# Patient Record
Sex: FEMALE | Race: OTHER | HISPANIC OR LATINO | URBAN - METROPOLITAN AREA
[De-identification: names, ages, dates, MRNs, and addresses within clinical notes are randomized per-mention and may not be internally consistent; named-entity substitution may affect disease eponyms.]

---

## 2018-06-17 ENCOUNTER — INPATIENT (INPATIENT)
Facility: HOSPITAL | Age: 78
LOS: 2 days | Discharge: HOME | DRG: 641 | End: 2018-06-20
Attending: HOSPITALIST | Admitting: HOSPITALIST
Payer: MEDICARE

## 2018-06-17 VITALS
DIASTOLIC BLOOD PRESSURE: 74 MMHG | WEIGHT: 100.09 LBS | HEART RATE: 106 BPM | SYSTOLIC BLOOD PRESSURE: 125 MMHG | OXYGEN SATURATION: 99 % | HEIGHT: 63 IN | RESPIRATION RATE: 20 BRPM | TEMPERATURE: 98 F

## 2018-06-17 DIAGNOSIS — I10 ESSENTIAL (PRIMARY) HYPERTENSION: ICD-10-CM

## 2018-06-17 DIAGNOSIS — Z29.9 ENCOUNTER FOR PROPHYLACTIC MEASURES, UNSPECIFIED: ICD-10-CM

## 2018-06-17 DIAGNOSIS — E03.9 HYPOTHYROIDISM, UNSPECIFIED: ICD-10-CM

## 2018-06-17 DIAGNOSIS — R55 SYNCOPE AND COLLAPSE: ICD-10-CM

## 2018-06-17 DIAGNOSIS — Z90.49 ACQUIRED ABSENCE OF OTHER SPECIFIED PARTS OF DIGESTIVE TRACT: Chronic | ICD-10-CM

## 2018-06-17 LAB
ALBUMIN SERPL ELPH-MCNC: 3.2 G/DL — LOW (ref 3.5–5)
ALP SERPL-CCNC: 73 U/L — SIGNIFICANT CHANGE UP (ref 40–120)
ALT FLD-CCNC: 18 U/L DA — SIGNIFICANT CHANGE UP (ref 10–60)
ANION GAP SERPL CALC-SCNC: 8 MMOL/L — SIGNIFICANT CHANGE UP (ref 5–17)
APPEARANCE UR: CLEAR — SIGNIFICANT CHANGE UP
APTT BLD: 26.5 SEC — LOW (ref 27.5–37.4)
AST SERPL-CCNC: 39 U/L — SIGNIFICANT CHANGE UP (ref 10–40)
BASOPHILS # BLD AUTO: 0.1 K/UL — SIGNIFICANT CHANGE UP (ref 0–0.2)
BASOPHILS NFR BLD AUTO: 0.7 % — SIGNIFICANT CHANGE UP (ref 0–2)
BILIRUB SERPL-MCNC: 0.3 MG/DL — SIGNIFICANT CHANGE UP (ref 0.2–1.2)
BILIRUB UR-MCNC: NEGATIVE — SIGNIFICANT CHANGE UP
BUN SERPL-MCNC: 15 MG/DL — SIGNIFICANT CHANGE UP (ref 7–18)
CALCIUM SERPL-MCNC: 8.4 MG/DL — SIGNIFICANT CHANGE UP (ref 8.4–10.5)
CHLORIDE SERPL-SCNC: 103 MMOL/L — SIGNIFICANT CHANGE UP (ref 96–108)
CK MB BLD-MCNC: <1.6 % — SIGNIFICANT CHANGE UP (ref 0–3.5)
CK MB CFR SERPL CALC: <1 NG/ML — SIGNIFICANT CHANGE UP (ref 0–3.6)
CK SERPL-CCNC: 62 U/L — SIGNIFICANT CHANGE UP (ref 21–215)
CO2 SERPL-SCNC: 24 MMOL/L — SIGNIFICANT CHANGE UP (ref 22–31)
COLOR SPEC: YELLOW — SIGNIFICANT CHANGE UP
CREAT SERPL-MCNC: 0.72 MG/DL — SIGNIFICANT CHANGE UP (ref 0.5–1.3)
DIFF PNL FLD: NEGATIVE — SIGNIFICANT CHANGE UP
EOSINOPHIL # BLD AUTO: 0.3 K/UL — SIGNIFICANT CHANGE UP (ref 0–0.5)
EOSINOPHIL NFR BLD AUTO: 2.1 % — SIGNIFICANT CHANGE UP (ref 0–6)
GLUCOSE SERPL-MCNC: 143 MG/DL — HIGH (ref 70–99)
GLUCOSE UR QL: NEGATIVE — SIGNIFICANT CHANGE UP
HCT VFR BLD CALC: 35.8 % — SIGNIFICANT CHANGE UP (ref 34.5–45)
HGB BLD-MCNC: 11.5 G/DL — SIGNIFICANT CHANGE UP (ref 11.5–15.5)
INR BLD: 1.06 RATIO — SIGNIFICANT CHANGE UP (ref 0.88–1.16)
KETONES UR-MCNC: NEGATIVE — SIGNIFICANT CHANGE UP
LEUKOCYTE ESTERASE UR-ACNC: NEGATIVE — SIGNIFICANT CHANGE UP
LYMPHOCYTES # BLD AUTO: 1.7 K/UL — SIGNIFICANT CHANGE UP (ref 1–3.3)
LYMPHOCYTES # BLD AUTO: 13.1 % — SIGNIFICANT CHANGE UP (ref 13–44)
MCHC RBC-ENTMCNC: 30.1 PG — SIGNIFICANT CHANGE UP (ref 27–34)
MCHC RBC-ENTMCNC: 32.1 GM/DL — SIGNIFICANT CHANGE UP (ref 32–36)
MCV RBC AUTO: 93.8 FL — SIGNIFICANT CHANGE UP (ref 80–100)
MONOCYTES # BLD AUTO: 0.9 K/UL — SIGNIFICANT CHANGE UP (ref 0–0.9)
MONOCYTES NFR BLD AUTO: 7.2 % — SIGNIFICANT CHANGE UP (ref 2–14)
NEUTROPHILS # BLD AUTO: 9.9 K/UL — HIGH (ref 1.8–7.4)
NEUTROPHILS NFR BLD AUTO: 76.9 % — SIGNIFICANT CHANGE UP (ref 43–77)
NITRITE UR-MCNC: NEGATIVE — SIGNIFICANT CHANGE UP
NT-PROBNP SERPL-SCNC: 123 PG/ML — SIGNIFICANT CHANGE UP (ref 0–450)
PH UR: 6.5 — SIGNIFICANT CHANGE UP (ref 5–8)
PLATELET # BLD AUTO: 264 K/UL — SIGNIFICANT CHANGE UP (ref 150–400)
POTASSIUM SERPL-MCNC: 3.1 MMOL/L — LOW (ref 3.5–5.3)
POTASSIUM SERPL-SCNC: 3.1 MMOL/L — LOW (ref 3.5–5.3)
PROT SERPL-MCNC: 6.8 G/DL — SIGNIFICANT CHANGE UP (ref 6–8.3)
PROT UR-MCNC: NEGATIVE — SIGNIFICANT CHANGE UP
PROTHROM AB SERPL-ACNC: 11.6 SEC — SIGNIFICANT CHANGE UP (ref 9.8–12.7)
RBC # BLD: 3.82 M/UL — SIGNIFICANT CHANGE UP (ref 3.8–5.2)
RBC # FLD: 13 % — SIGNIFICANT CHANGE UP (ref 10.3–14.5)
SODIUM SERPL-SCNC: 135 MMOL/L — SIGNIFICANT CHANGE UP (ref 135–145)
SP GR SPEC: 1 — LOW (ref 1.01–1.02)
TROPONIN I SERPL-MCNC: <0.015 NG/ML — SIGNIFICANT CHANGE UP (ref 0–0.04)
UROBILINOGEN FLD QL: NEGATIVE — SIGNIFICANT CHANGE UP
WBC # BLD: 12.9 K/UL — HIGH (ref 3.8–10.5)
WBC # FLD AUTO: 12.9 K/UL — HIGH (ref 3.8–10.5)

## 2018-06-17 PROCEDURE — 71045 X-RAY EXAM CHEST 1 VIEW: CPT | Mod: 26

## 2018-06-17 PROCEDURE — 99285 EMERGENCY DEPT VISIT HI MDM: CPT

## 2018-06-17 RX ORDER — ASPIRIN/CALCIUM CARB/MAGNESIUM 324 MG
81 TABLET ORAL DAILY
Qty: 0 | Refills: 0 | Status: DISCONTINUED | OUTPATIENT
Start: 2018-06-17 | End: 2018-06-20

## 2018-06-17 RX ORDER — ENOXAPARIN SODIUM 100 MG/ML
40 INJECTION SUBCUTANEOUS DAILY
Qty: 0 | Refills: 0 | Status: DISCONTINUED | OUTPATIENT
Start: 2018-06-17 | End: 2018-06-20

## 2018-06-17 RX ORDER — LEVOTHYROXINE SODIUM 125 MCG
50 TABLET ORAL DAILY
Qty: 0 | Refills: 0 | Status: DISCONTINUED | OUTPATIENT
Start: 2018-06-17 | End: 2018-06-18

## 2018-06-17 RX ORDER — POTASSIUM CHLORIDE 20 MEQ
10 PACKET (EA) ORAL
Qty: 0 | Refills: 0 | Status: COMPLETED | OUTPATIENT
Start: 2018-06-17 | End: 2018-06-17

## 2018-06-17 RX ORDER — SODIUM CHLORIDE 9 MG/ML
3 INJECTION INTRAMUSCULAR; INTRAVENOUS; SUBCUTANEOUS ONCE
Qty: 0 | Refills: 0 | Status: COMPLETED | OUTPATIENT
Start: 2018-06-17 | End: 2018-06-17

## 2018-06-17 RX ORDER — ATORVASTATIN CALCIUM 80 MG/1
40 TABLET, FILM COATED ORAL AT BEDTIME
Qty: 0 | Refills: 0 | Status: DISCONTINUED | OUTPATIENT
Start: 2018-06-17 | End: 2018-06-20

## 2018-06-17 RX ORDER — SODIUM CHLORIDE 9 MG/ML
1000 INJECTION INTRAMUSCULAR; INTRAVENOUS; SUBCUTANEOUS
Qty: 0 | Refills: 0 | Status: DISCONTINUED | OUTPATIENT
Start: 2018-06-17 | End: 2018-06-20

## 2018-06-17 RX ORDER — METOPROLOL TARTRATE 50 MG
25 TABLET ORAL DAILY
Qty: 0 | Refills: 0 | Status: DISCONTINUED | OUTPATIENT
Start: 2018-06-17 | End: 2018-06-19

## 2018-06-17 RX ADMIN — Medication 100 MILLIEQUIVALENT(S): at 17:36

## 2018-06-17 RX ADMIN — SODIUM CHLORIDE 3 MILLILITER(S): 9 INJECTION INTRAMUSCULAR; INTRAVENOUS; SUBCUTANEOUS at 14:07

## 2018-06-17 RX ADMIN — Medication 100 MILLIEQUIVALENT(S): at 19:52

## 2018-06-17 RX ADMIN — Medication 100 MILLIEQUIVALENT(S): at 18:40

## 2018-06-17 NOTE — H&P ADULT - HISTORY OF PRESENT ILLNESS
79 yo Female with PMH of Hypothyroidism and HTN , comes in with complaint of abdominal pain followed by diarrhea ( 2 loose bowel movements ) . Pain was sudden in onset , about 4/10 in intensity , generalized , accompanied and partially relived by 2 loose BMs. Pain resolved spontaneously Patient also gave a very vague history of syncope , where she remembers going to the bathroom and then she was on the couch , when her daughter woke her up after she passed out for few minutes. Patient does not recall the event. No similar episodes in past . Patient felt that she ate outside Pizza yesterday and that's why she had pain. She also report her daughter being sick recently with a cold.   Other than that no hospitalizations , no fever , no chest pain , no palpitations .  Patient had a mechanical fall 2 years back , ended up with Left sided THR.     In Ed , BP : 125/58 mm hg , HR : 92 , Temp : 98 F  cbc wnl   bmp : hypokalemia   T1 negative   Ua negative

## 2018-06-17 NOTE — H&P ADULT - PROBLEM SELECTOR PLAN 3
Patient does not remember the name of medication and Pharmacy   Will start with Synthyroid 50 for now   f/u tsh and f/u medication dose

## 2018-06-17 NOTE — H&P ADULT - ASSESSMENT
79 yo Female with PMH of Hypothyroidism and HTN , comes in with complaint of abdominal pain followed by diarrhea ( 2 loose bowel movements ) . Pain was sudden in onset , about 4/10 in intensity , generalized , accompanied and partially relived by 2 loose BMs. Pain resolved spontaneously Patient also gave a very vague history of syncope , where she remembers going to the bathroom and then she was on the couch , when her daughter woke her up after she passed out for few minutes. Patient does not recall the event. No similar episodes in past . Patient felt that she ate outside [employer] yesterday and that's why she had pain. She also report her daughter being sick recently with a cold.   Other than that no hospitalizations , no fever , no chest pain , no palpitations .  Patient had a mechanical fall 2 years back , ended up with Left sided THR.     In Ed , BP : 125/58 mm hg , HR : 92 , Temp : 98 F  cbc wnl   bmp : hypokalemia   T1 negative   Ua negative     Will admit to telemetry for Syncope.

## 2018-06-17 NOTE — ED ADULT NURSE NOTE - OBJECTIVE STATEMENT
pt is here for syncope.  According to family member, pt was unresponsive for 10 sce, denied NV/D, denied fever pt calm at this time with family member.

## 2018-06-17 NOTE — H&P ADULT - PROBLEM SELECTOR PLAN 2
Patient does not remember the name of medication and Pharmacy   Daughter will get medicine tomorrow   Please follow up  Will start with Metoprolol for now   Dash diet

## 2018-06-17 NOTE — H&P ADULT - PROBLEM SELECTOR PLAN 1
Patient comes in with very vague history of syncope   Does not remember the event   No injury to head however   Will rule out cardiac causes by monitoring on telemetry and trending troponins  Start with aspirin ,statin   However patient's symptoms were preceded by loose stools and Gi symptoms   Most likely syncope du to dehydration   Will start with NS at 100 cc/h   Will send C.diff and stool studies if patient has further episodes of diarrhea ( resolved for now)  Could just be viral Gastroenteritis  Orthostatic positive on admission , likely from dehydration , will hydrate   Patient was also tachycardiac , however Sinus tachycardia on EKG and Monitor   Also from dehydration

## 2018-06-17 NOTE — ED PROVIDER NOTE - OBJECTIVE STATEMENT
79 y/o F with PMHx of hypothyroidism, HTN and no significant PSHx presents to the ED s/p syncopal episode today with associated weakness and dizziness. Patient reports syncopal episode occurred while seated in chair at home. Patient reports episode of diarrhea which was described as loose and yellow. Denies chest pain, SOB, abdominal pain, fever or any other complaints. Allergies: PCN (rash).

## 2018-06-17 NOTE — H&P ADULT - PROBLEM SELECTOR PLAN 4
IMPROVE VTE Individual Risk Assessment          RISK                                                          Points  [  ] Previous VTE                                                3  [  ] Thrombophilia                                             2  [  ] Lower limb paralysis                                   2        (unable to hold up >15 seconds)    [  ] Current Cancer                                             2         (within 6 months)  [ x ] Immobilization > 24 hrs                              1  [  ] ICU/CCU stay > 24 hours                             1  [  x] Age > 60                                                         1    IMPROVE VTE Score:  2   Will start with Levonox for dvt ppx

## 2018-06-17 NOTE — ED PROVIDER NOTE - MEDICAL DECISION MAKING DETAILS
79 y/o elderly female presents following episode of syncope possibly due to dehydration. Will check labs, administer IV fluids and reassess.

## 2018-06-17 NOTE — ED PROVIDER NOTE - ENMT, MLM
Airway patent, Nasal mucosa clear. Oral mucosa is dry. Throat has no vesicles, no oropharyngeal exudates and uvula is midline.

## 2018-06-17 NOTE — H&P ADULT - NSHPLABSRESULTS_GEN_ALL_CORE
11.5   12.9  )-----------( 264      ( 17 Jun 2018 14:10 )             35.8     06-17    135  |  103  |  15  ----------------------------<  143<H>  3.1<L>   |  24  |  0.72    Ca    8.4      17 Jun 2018 14:10    TPro  6.8  /  Alb  3.2<L>  /  TBili  0.3  /  DBili  x   /  AST  39  /  ALT  18  /  AlkPhos  73  06-17    < from: Xray Chest 1 View AP/PA (06.17.18 @ 14:17) >    IMPRESSION:  Nonspecific increased interstitial lung markings. No gross consolidation   or pleural effusion.    Heart size cannot be accurately assessed in this projection.    Right upper quadrant surgical clips.      < end of copied text >

## 2018-06-17 NOTE — H&P ADULT - ATTENDING COMMENTS
patient was seen and examined at bedside, feeling ok,  she states feeling abdominal pain, after which she went to the bathroom and had diarrhea x2 BM, she then felt dizzy, with sweating and feeling sleepy. she also lost control over urine before waking up and finding her daughter in front of her.  physical exam:  tele: episode of NSVT    as above    A/p  1- syncope:   possible vasovagal, rule out seizures:   EEG to rule out seizure  orthostatic negative  ct head, carotid doppler with attention to basilar circulation.  Cardio evaluation: evidence of NSVT on tele, but patient denies any CP, palpitation.    2- Hypothyroidism: TSH low,  will confirm dose of levothyroxine,     3- Replace vitamin b12, low norm.         rest as above.

## 2018-06-17 NOTE — H&P ADULT - NSHPPHYSICALEXAM_GEN_ALL_CORE
GENERAL: tired looking , flushed , tense , shiny skin   HEENT: Normocephalic;  conjunctivae and sclerae clear; moist mucous membranes;   NECK : supple  CHEST/LUNG: Clear to auscultation bilaterally with good air entry   HEART: S1 S2  regular; no murmurs, ; tachycardia +  ABDOMEN: Soft, Nontender, Nondistended; Bowel sounds present  EXTREMITIES: no cyanosis; no edema; no calf tenderness  SKIN: warm and dry; no rash  NERVOUS SYSTEM:  Awake and alert; Oriented  to place, person and time ; no new deficits

## 2018-06-18 LAB
24R-OH-CALCIDIOL SERPL-MCNC: 22.6 NG/ML — LOW (ref 30–80)
ANION GAP SERPL CALC-SCNC: 9 MMOL/L — SIGNIFICANT CHANGE UP (ref 5–17)
BASOPHILS # BLD AUTO: 0.1 K/UL — SIGNIFICANT CHANGE UP (ref 0–0.2)
BASOPHILS NFR BLD AUTO: 0.7 % — SIGNIFICANT CHANGE UP (ref 0–2)
BUN SERPL-MCNC: 6 MG/DL — LOW (ref 7–18)
CALCIUM SERPL-MCNC: 8.3 MG/DL — LOW (ref 8.4–10.5)
CHLORIDE SERPL-SCNC: 112 MMOL/L — HIGH (ref 96–108)
CHOLEST SERPL-MCNC: 149 MG/DL — SIGNIFICANT CHANGE UP (ref 10–199)
CK MB BLD-MCNC: 2.1 % — SIGNIFICANT CHANGE UP (ref 0–3.5)
CK MB CFR SERPL CALC: 1.3 NG/ML — SIGNIFICANT CHANGE UP (ref 0–3.6)
CK SERPL-CCNC: 62 U/L — SIGNIFICANT CHANGE UP (ref 21–215)
CO2 SERPL-SCNC: 22 MMOL/L — SIGNIFICANT CHANGE UP (ref 22–31)
CREAT SERPL-MCNC: 0.58 MG/DL — SIGNIFICANT CHANGE UP (ref 0.5–1.3)
EOSINOPHIL # BLD AUTO: 0.4 K/UL — SIGNIFICANT CHANGE UP (ref 0–0.5)
EOSINOPHIL NFR BLD AUTO: 4.7 % — SIGNIFICANT CHANGE UP (ref 0–6)
GLUCOSE SERPL-MCNC: 81 MG/DL — SIGNIFICANT CHANGE UP (ref 70–99)
HBA1C BLD-MCNC: 5.4 % — SIGNIFICANT CHANGE UP (ref 4–5.6)
HCT VFR BLD CALC: 33.4 % — LOW (ref 34.5–45)
HDLC SERPL-MCNC: 53 MG/DL — SIGNIFICANT CHANGE UP (ref 40–125)
HGB BLD-MCNC: 10.6 G/DL — LOW (ref 11.5–15.5)
LACTATE SERPL-SCNC: 0.9 MMOL/L — SIGNIFICANT CHANGE UP (ref 0.7–2)
LIPID PNL WITH DIRECT LDL SERPL: 82 MG/DL — SIGNIFICANT CHANGE UP
LYMPHOCYTES # BLD AUTO: 2.4 K/UL — SIGNIFICANT CHANGE UP (ref 1–3.3)
LYMPHOCYTES # BLD AUTO: 25.5 % — SIGNIFICANT CHANGE UP (ref 13–44)
MAGNESIUM SERPL-MCNC: 1.7 MG/DL — SIGNIFICANT CHANGE UP (ref 1.6–2.6)
MCHC RBC-ENTMCNC: 30.2 PG — SIGNIFICANT CHANGE UP (ref 27–34)
MCHC RBC-ENTMCNC: 31.9 GM/DL — LOW (ref 32–36)
MCV RBC AUTO: 94.9 FL — SIGNIFICANT CHANGE UP (ref 80–100)
MONOCYTES # BLD AUTO: 0.6 K/UL — SIGNIFICANT CHANGE UP (ref 0–0.9)
MONOCYTES NFR BLD AUTO: 6.5 % — SIGNIFICANT CHANGE UP (ref 2–14)
NEUTROPHILS # BLD AUTO: 5.8 K/UL — SIGNIFICANT CHANGE UP (ref 1.8–7.4)
NEUTROPHILS NFR BLD AUTO: 62.6 % — SIGNIFICANT CHANGE UP (ref 43–77)
PHOSPHATE SERPL-MCNC: 2.2 MG/DL — LOW (ref 2.5–4.5)
PLATELET # BLD AUTO: 249 K/UL — SIGNIFICANT CHANGE UP (ref 150–400)
POTASSIUM SERPL-MCNC: 4.1 MMOL/L — SIGNIFICANT CHANGE UP (ref 3.5–5.3)
POTASSIUM SERPL-SCNC: 4.1 MMOL/L — SIGNIFICANT CHANGE UP (ref 3.5–5.3)
RBC # BLD: 3.52 M/UL — LOW (ref 3.8–5.2)
RBC # FLD: 13.2 % — SIGNIFICANT CHANGE UP (ref 10.3–14.5)
SODIUM SERPL-SCNC: 143 MMOL/L — SIGNIFICANT CHANGE UP (ref 135–145)
TOTAL CHOLESTEROL/HDL RATIO MEASUREMENT: 2.8 RATIO — LOW (ref 3.3–7.1)
TRIGL SERPL-MCNC: 72 MG/DL — SIGNIFICANT CHANGE UP (ref 10–149)
TROPONIN I SERPL-MCNC: 0.02 NG/ML — SIGNIFICANT CHANGE UP (ref 0–0.04)
TSH SERPL-MCNC: 0.19 UU/ML — LOW (ref 0.34–4.82)
TSH SERPL-MCNC: 0.26 UU/ML — LOW (ref 0.34–4.82)
VIT B12 SERPL-MCNC: 378 PG/ML — SIGNIFICANT CHANGE UP (ref 232–1245)
WBC # BLD: 9.3 K/UL — SIGNIFICANT CHANGE UP (ref 3.8–10.5)
WBC # FLD AUTO: 9.3 K/UL — SIGNIFICANT CHANGE UP (ref 3.8–10.5)

## 2018-06-18 PROCEDURE — 93880 EXTRACRANIAL BILAT STUDY: CPT | Mod: 26

## 2018-06-18 PROCEDURE — 95819 EEG AWAKE AND ASLEEP: CPT | Mod: 26

## 2018-06-18 PROCEDURE — 70450 CT HEAD/BRAIN W/O DYE: CPT | Mod: 26

## 2018-06-18 PROCEDURE — 99223 1ST HOSP IP/OBS HIGH 75: CPT | Mod: AI,GC

## 2018-06-18 RX ORDER — SODIUM,POTASSIUM PHOSPHATES 278-250MG
1 POWDER IN PACKET (EA) ORAL ONCE
Qty: 0 | Refills: 0 | Status: COMPLETED | OUTPATIENT
Start: 2018-06-18 | End: 2018-06-18

## 2018-06-18 RX ORDER — HYDROCHLOROTHIAZIDE 25 MG
12.5 TABLET ORAL DAILY
Qty: 0 | Refills: 0 | Status: DISCONTINUED | OUTPATIENT
Start: 2018-06-18 | End: 2018-06-19

## 2018-06-18 RX ORDER — PREGABALIN 225 MG/1
1000 CAPSULE ORAL DAILY
Qty: 0 | Refills: 0 | Status: DISCONTINUED | OUTPATIENT
Start: 2018-06-18 | End: 2018-06-20

## 2018-06-18 RX ORDER — LISINOPRIL 2.5 MG/1
10 TABLET ORAL DAILY
Qty: 0 | Refills: 0 | Status: DISCONTINUED | OUTPATIENT
Start: 2018-06-18 | End: 2018-06-20

## 2018-06-18 RX ORDER — LEVOTHYROXINE SODIUM 125 MCG
75 TABLET ORAL DAILY
Qty: 0 | Refills: 0 | Status: DISCONTINUED | OUTPATIENT
Start: 2018-06-18 | End: 2018-06-19

## 2018-06-18 RX ORDER — CHOLECALCIFEROL (VITAMIN D3) 125 MCG
2000 CAPSULE ORAL DAILY
Qty: 0 | Refills: 0 | Status: DISCONTINUED | OUTPATIENT
Start: 2018-06-18 | End: 2018-06-20

## 2018-06-18 RX ORDER — ACETAMINOPHEN 500 MG
650 TABLET ORAL EVERY 6 HOURS
Qty: 0 | Refills: 0 | Status: DISCONTINUED | OUTPATIENT
Start: 2018-06-18 | End: 2018-06-20

## 2018-06-18 RX ADMIN — Medication 81 MILLIGRAM(S): at 12:35

## 2018-06-18 RX ADMIN — ENOXAPARIN SODIUM 40 MILLIGRAM(S): 100 INJECTION SUBCUTANEOUS at 12:32

## 2018-06-18 RX ADMIN — Medication 50 MICROGRAM(S): at 06:21

## 2018-06-18 RX ADMIN — LISINOPRIL 10 MILLIGRAM(S): 2.5 TABLET ORAL at 12:36

## 2018-06-18 RX ADMIN — ATORVASTATIN CALCIUM 40 MILLIGRAM(S): 80 TABLET, FILM COATED ORAL at 21:57

## 2018-06-18 RX ADMIN — SODIUM CHLORIDE 100 MILLILITER(S): 9 INJECTION INTRAMUSCULAR; INTRAVENOUS; SUBCUTANEOUS at 02:09

## 2018-06-18 RX ADMIN — Medication 650 MILLIGRAM(S): at 18:55

## 2018-06-18 RX ADMIN — Medication 12.5 MILLIGRAM(S): at 15:20

## 2018-06-18 RX ADMIN — Medication 1 TABLET(S): at 15:19

## 2018-06-18 RX ADMIN — Medication 75 MICROGRAM(S): at 15:20

## 2018-06-18 RX ADMIN — Medication 650 MILLIGRAM(S): at 19:30

## 2018-06-18 RX ADMIN — PREGABALIN 1000 MICROGRAM(S): 225 CAPSULE ORAL at 14:59

## 2018-06-18 NOTE — EEG REPORT - NS EEG TEXT BOX
[facility]   COMPREHENSIVE EPILEPSY CENTER   REPORT OF ROUTINE VIDEO EEG     [facility]: [address], [place] [address], Ph#: [phone]  [facility]: [address], [place] [address], Ph#: [phone]  Office: [address] [place] [address], [facility] [address], [place] [address] Ph#: [phone]    Patient Name: MARINA DENNIS  Age and : 78y (40)  MRN #: [id number]  Location: [address]  Referring Physician: Jaylyn Fine    Study Date: 18    _____________________________________________________________  TECHNICAL INFORMATION    Placement and Labeling of Electrodes:  The EEG was performed utilizing 20 channels referential EEG connections (coronal over temporal over parasagittal montage) using all standard 10-20 electrode placements with EKG.  Recording was at a sampling rate of 256 samples per second per channel.  Time synchronized digital video recording was done simultaneously with EEG recording.  A low light infrared camera was used for low light recording.  Riccardo and seizure detection algorithms were utilized.    _____________________________________________________________  HISTORY    Patient is a 78y old  Female who presents with a chief complaint of diarrhea , syncope (2018 22:59)      PERTINENT MEDICATION:  none    _____________________________________________________________  STUDY INTERPRETATION    Findings:  The background was continuous, spontaneously variable, and reactive. During wakefulness, the posterior dominant rhythm consisted of symmetric, well-modulated 10 Hz activity, with amplitude to 60 uV, that attenuated to eye opening.  Low amplitude frontal beta was noted in wakefulness.    Background Slowing:  No generalized background slowing was present.    Focal Slowing:   Possible rare intermittent subtle theta/delta slowing in the left anterior-mid temporal region (F7/T7).    Sleep Background:  - Drowsiness was characterized by fragmentation, attenuation, and slowing of the background activity.    - Stage II sleep transients were not recorded.    Other Non-Epileptiform Findings:  None were present.    Interictal Epileptiform Activity:   None were present.    Events:  Clinical events: None recorded.  Seizures: None recorded.    Activation Procedures:   Hyperventilation was performed and did not elicit any abnormality.  Photic stimulation was performed and did not elicit any abnormality.     Artifacts:  Intermittent myogenic and movement artifacts were noted.    ECG:  The heart rate on single channel ECG was predominantly between 70-80 BPM.    _____________________________________________________________  EEG SUMMARY/CLASSIFICATION    Rare intermittent subtle theta/delta slowing in the left anterior-mid temporal region (F7/T7).    _____________________________________________________________  EEG IMPRESSION/CLINICAL CORRELATE    1. Probable functional abnormality in the left anterior-mid temporal region.  2. No epileptiform pattern or seizure seen.      Jacqui Pineda MD  Attending Physician, [facility]

## 2018-06-18 NOTE — PROGRESS NOTE ADULT - PROBLEM SELECTOR PLAN 2
Patient does not remember the name of medication and Pharmacy   Will start with Metoprolol for now   Dash diet C/W Hydrochlorothiazide and Lisinopril    Dash diet

## 2018-06-18 NOTE — PROGRESS NOTE ADULT - ASSESSMENT
77 y/o Female with PMH of Hypothyroidism and HTN , comes in with complaint of abdominal pain followed by diarrhea (2 loose bowel movements ) . Pain was sudden in onset , about 4/10 in intensity , generalized , accompanied and partially relived by 2 loose BMs. Pain resolved spontaneously. Patient also gave a very vague history of syncope , where she remembers going to the bathroom and then she was on the couch , when her daughter woke her up after she passed out for few minutes. Patient does not recall the event. No similar episodes in past . Patient felt that she ate outside Pizza yesterday and that's why she had pain. She also report her daughter being sick recently with a cold. Other than that no hospitalizations , no fever , no chest pain , no palpitations .  Patient had a mechanical fall 2 years back , ended up with Left sided THR.     Admitted to the telemetry floor for Syncope.

## 2018-06-18 NOTE — PROGRESS NOTE ADULT - PROBLEM SELECTOR PLAN 1
Vague history of syncope, in which patient does not remember the event with no injury to head however  Work Up was done:   Cardiac enzymes were negative  Orthostatic positive   Most likely syncope due to dehydration after episodes of abdominal pain and diarrhea   No more episodes of diarrhea   Could just be viral Gastroenteritis  Management:   -Started with aspirin ,statin   -IV fluids Vague history of syncope, in which patient does not remember the event with no injury to head however  Spoke to the daughter. She was out for about 15 seconds and loss sphincter tone Difficulty to aroused   Work Up was done:   Cardiac enzymes were negative  Orthostatic positive   Most likely syncope due to dehydration after episodes of abdominal pain and diarrhea   No more episodes of diarrhea   Could just be viral Gastroenteritis  Management:   -Started with aspirin ,statin   -IV fluids - Vague history of syncope, in which patient does not remember the event with no injury to head however.   - Spoke to the daughter. She was out for about 15 seconds and loss sphincter tone. Seizure with difficulty to aroused  R/O Seizure with difficulty to aroused.    Work Up was done:   Cardiac enzymes were negative  Orthostatic positive   Most likely syncope due to dehydration after episodes of abdominal pain and diarrhea   No more episodes of diarrhea   Could just be viral Gastroenteritis  Management:   -Started with aspirin and statin   -IV fluids  -CT Scan of the head  -EGG : R/O Seizures - Vague history of syncope, in which patient does not remember the event with no injury to head however.   - Spoke to the daughter. She was out for about 15 seconds and loss sphincter tone. Seizure with difficulty to aroused  R/O Seizure with difficulty to aroused.    Work Up was done:   Cardiac enzymes were negative  Orthostatic positive--> Repeated Negative   Most likely syncope due to dehydration after episodes of abdominal pain and diarrhea   No more episodes of diarrhea   Management:   -Started with aspirin and statin   -IV fluids  -CT Scan of the head and Carotid doppler  -EGG : R/O Seizures

## 2018-06-18 NOTE — PROGRESS NOTE ADULT - SUBJECTIVE AND OBJECTIVE BOX
==================PGY 1 Note===================   Discussed with supervising resident and primary attending    ================CHIEF COMPLAINT===============  Patient is a 78y old  Female who presents with a chief complaint of diarrhea , syncope (2018 22:59)        =========INTERVAL HPI/OVERNIGHT EVENTS=========  Offers no new complaints      ============CURRENT MEDICATIONS===============    MEDICATIONS  (STANDING):  aspirin  chewable 81 milliGRAM(s) Oral daily  atorvastatin 40 milliGRAM(s) Oral at bedtime  enoxaparin Injectable 40 milliGRAM(s) SubCutaneous daily  levothyroxine 50 MICROGram(s) Oral daily  metoprolol succinate ER 25 milliGRAM(s) Oral daily  sodium chloride 0.9%. 1000 milliLiter(s) (100 mL/Hr) IV Continuous <Continuous>    MEDICATIONS  (PRN):        ============REVIEW OF SYSTEMS==================    CONSTITUTIONAL: No fever  EYES: no acute visual disturbances  NECK: No pain or stiffness  RESPIRATORY: No cough; No shortness of breath  CARDIOVASCULAR: No chest pain, no palpitations  GASTROINTESTINAL: No pain. No nausea or vomiting; No diarrhea   NEUROLOGICAL: No headache or numbness, no tremors  MUSCULOSKELETAL: No joint pain, no muscle pain  GENITOURINARY: no dysuria, no frequency, no hesitancy  PSYCHIATRY: no depression , no anxiety  ALL OTHER  ROS negative      ================VITALS SIGNS=====================  Vital Signs Last 24 Hrs  T(C): 36.8 (2018 05:29), Max: 36.9 (2018 19:45)  T(F): 98.2 (2018 05:29), Max: 98.5 (2018 23:08)  HR: 85 (2018 05:29) (85 - 106)  BP: 110/46 (2018 05:29) (109/53 - 133/60)  BP(mean): --  RR: 18 (2018 05:29) (16 - 20)  SpO2: 100% (2018 05:29) (97% - 100%)    ===============PHYSICAL EXAM====================    GENERAL: NAD  HEENT: Normocephalic;  conjunctivae and sclerae clear; moist mucous membranes;   NECK : supple  CHEST/LUNG: Clear to auscultation bilaterally with good air entry   HEART: S1 S2  regular; no murmurs, gallops or rubs  ABDOMEN: Soft, Nontender, Nondistended; Bowel sounds present  EXTREMITIES: no cyanosis; no edema; no calf tenderness  SKIN: warm and dry; no rash  NERVOUS SYSTEM:  Awake and alert; Oriented  to place, person and time    ==============LABORATORIES======================  LABS:                        11.5   12.9  )-----------( 264      ( 2018 14:10 )             35.8     06-17    135  |  103  |  15  ----------------------------<  143<H>  3.1<L>   |  24  |  0.72    Ca    8.4      2018 14:10    TPro  6.8  /  Alb  3.2<L>  /  TBili  0.3  /  DBili  x   /  AST  39  /  ALT  18  /  AlkPhos  73  06-17    PT/INR - ( 2018 14:10 )   PT: 11.6 sec;   INR: 1.06 ratio         PTT - ( 2018 14:10 )  PTT:26.5 sec  Urinalysis Basic - ( 2018 17:33 )    Color: Yellow / Appearance: Clear / S.005 / pH: x  Gluc: x / Ketone: Negative  / Bili: Negative / Urobili: Negative   Blood: x / Protein: Negative / Nitrite: Negative   Leuk Esterase: Negative / RBC: x / WBC x   Sq Epi: x / Non Sq Epi: x / Bacteria: x      CAPILLARY BLOOD GLUCOSE      POCT Blood Glucose.: 149 mg/dL (2018 12:45)      =============INPUTS/OUPUTS=====================        RADIOLOGY & ADDITIONAL TESTS:    Imaging Personally Reviewed:  YES    Consultant(s) Notes Reviewed:   YES    Care Discussed with Consultants : YES    Plan of care was discussed with patient  and /or primary care giver; all questions and concerns were addressed and care was aligned with patient's wishes. Time was allowed for questions that were answered to the best of my abilities

## 2018-06-19 LAB
ANION GAP SERPL CALC-SCNC: 10 MMOL/L — SIGNIFICANT CHANGE UP (ref 5–17)
BASOPHILS # BLD AUTO: 0.1 K/UL — SIGNIFICANT CHANGE UP (ref 0–0.2)
BASOPHILS NFR BLD AUTO: 1.2 % — SIGNIFICANT CHANGE UP (ref 0–2)
BUN SERPL-MCNC: 6 MG/DL — LOW (ref 7–18)
CALCIUM SERPL-MCNC: 8.7 MG/DL — SIGNIFICANT CHANGE UP (ref 8.4–10.5)
CHLORIDE SERPL-SCNC: 107 MMOL/L — SIGNIFICANT CHANGE UP (ref 96–108)
CO2 SERPL-SCNC: 23 MMOL/L — SIGNIFICANT CHANGE UP (ref 22–31)
CREAT SERPL-MCNC: 0.54 MG/DL — SIGNIFICANT CHANGE UP (ref 0.5–1.3)
EOSINOPHIL # BLD AUTO: 0.5 K/UL — SIGNIFICANT CHANGE UP (ref 0–0.5)
EOSINOPHIL NFR BLD AUTO: 6.3 % — HIGH (ref 0–6)
GLUCOSE SERPL-MCNC: 88 MG/DL — SIGNIFICANT CHANGE UP (ref 70–99)
HCT VFR BLD CALC: 31.7 % — LOW (ref 34.5–45)
HGB BLD-MCNC: 10.7 G/DL — LOW (ref 11.5–15.5)
LYMPHOCYTES # BLD AUTO: 2 K/UL — SIGNIFICANT CHANGE UP (ref 1–3.3)
LYMPHOCYTES # BLD AUTO: 25.8 % — SIGNIFICANT CHANGE UP (ref 13–44)
MCHC RBC-ENTMCNC: 31.7 PG — SIGNIFICANT CHANGE UP (ref 27–34)
MCHC RBC-ENTMCNC: 33.8 GM/DL — SIGNIFICANT CHANGE UP (ref 32–36)
MCV RBC AUTO: 93.8 FL — SIGNIFICANT CHANGE UP (ref 80–100)
MONOCYTES # BLD AUTO: 0.6 K/UL — SIGNIFICANT CHANGE UP (ref 0–0.9)
MONOCYTES NFR BLD AUTO: 8.3 % — SIGNIFICANT CHANGE UP (ref 2–14)
NEUTROPHILS # BLD AUTO: 4.5 K/UL — SIGNIFICANT CHANGE UP (ref 1.8–7.4)
NEUTROPHILS NFR BLD AUTO: 58.5 % — SIGNIFICANT CHANGE UP (ref 43–77)
PLATELET # BLD AUTO: 228 K/UL — SIGNIFICANT CHANGE UP (ref 150–400)
POTASSIUM SERPL-MCNC: 3.7 MMOL/L — SIGNIFICANT CHANGE UP (ref 3.5–5.3)
POTASSIUM SERPL-SCNC: 3.7 MMOL/L — SIGNIFICANT CHANGE UP (ref 3.5–5.3)
RBC # BLD: 3.38 M/UL — LOW (ref 3.8–5.2)
RBC # FLD: 12.9 % — SIGNIFICANT CHANGE UP (ref 10.3–14.5)
SODIUM SERPL-SCNC: 140 MMOL/L — SIGNIFICANT CHANGE UP (ref 135–145)
WBC # BLD: 7.6 K/UL — SIGNIFICANT CHANGE UP (ref 3.8–10.5)
WBC # FLD AUTO: 7.6 K/UL — SIGNIFICANT CHANGE UP (ref 3.8–10.5)

## 2018-06-19 PROCEDURE — 99233 SBSQ HOSP IP/OBS HIGH 50: CPT | Mod: GC

## 2018-06-19 RX ORDER — LEVOTHYROXINE SODIUM 125 MCG
50 TABLET ORAL DAILY
Qty: 0 | Refills: 0 | Status: DISCONTINUED | OUTPATIENT
Start: 2018-06-19 | End: 2018-06-20

## 2018-06-19 RX ORDER — METOPROLOL TARTRATE 50 MG
50 TABLET ORAL DAILY
Qty: 0 | Refills: 0 | Status: DISCONTINUED | OUTPATIENT
Start: 2018-06-19 | End: 2018-06-20

## 2018-06-19 RX ADMIN — Medication 12.5 MILLIGRAM(S): at 06:19

## 2018-06-19 RX ADMIN — ATORVASTATIN CALCIUM 40 MILLIGRAM(S): 80 TABLET, FILM COATED ORAL at 21:57

## 2018-06-19 RX ADMIN — PREGABALIN 1000 MICROGRAM(S): 225 CAPSULE ORAL at 13:07

## 2018-06-19 RX ADMIN — Medication 75 MICROGRAM(S): at 06:19

## 2018-06-19 RX ADMIN — LISINOPRIL 10 MILLIGRAM(S): 2.5 TABLET ORAL at 06:19

## 2018-06-19 RX ADMIN — Medication 25 MILLIGRAM(S): at 06:19

## 2018-06-19 RX ADMIN — ENOXAPARIN SODIUM 40 MILLIGRAM(S): 100 INJECTION SUBCUTANEOUS at 13:07

## 2018-06-19 RX ADMIN — Medication 81 MILLIGRAM(S): at 13:06

## 2018-06-19 RX ADMIN — Medication 2000 UNIT(S): at 13:06

## 2018-06-19 NOTE — PROGRESS NOTE ADULT - PROBLEM SELECTOR PLAN 1
- Vague history of syncope, in which patient does not remember the event with no injury to head however.   - Spoke to the daughter. She was out for about 15 seconds and loss sphincter tone. Seizure with difficulty to aroused  R/O Seizure with difficulty to aroused.    Work Up was done:   Cardiac enzymes were negative  Orthostatic positive--> Repeated Negative   Most likely syncope due to dehydration after episodes of abdominal pain and diarrhea   No more episodes of diarrhea   -CT Scan of the head and Carotid doppler: Negative for bleeding or Significant stenosis  Management:   -EGG : R/O Seizures - Vague history of syncope, in which patient does not remember the event with no injury to head however.   - Spoke to the daughter. She was out for about 15 seconds and loss sphincter tone. Seizure with difficulty to aroused  R/O Seizure with difficulty to aroused.    Work Up was done:   Cardiac enzymes were negative  Orthostatic positive--> Repeated Negative   Most likely syncope due to dehydration after episodes of abdominal pain and diarrhea   No more episodes of diarrhea   -CT Scan of the head and Carotid doppler: Negative for bleeding or Significant stenosis  -EGG : Negative for Seizures  -Changed Metoprolol to 50 BID and D/C HCTZ

## 2018-06-19 NOTE — DISCHARGE NOTE ADULT - CARE PLAN
Principal Discharge DX:	Syncope  Secondary Diagnosis:	HTN (hypertension)  Assessment and plan of treatment:	Continue with blood pressure medication. Maintain a healthy diet that consist of low sugar, low fat, low sodium diet. Exercise frequently if possible.  Follow up with primary care physician in one week after discharge.  Secondary Diagnosis:	Hypothyroidism  Assessment and plan of treatment:	Continue with your synthroid medication. Get constant follow up with TSH levels with you primary care physician for medication adjustment if needed. Principal Discharge DX:	Syncope  Goal:	Resolved. Likely vasovagal.  Assessment and plan of treatment:	Admitted to the telemetry floor for Syncope.   Work Up was done: Cardiac enzymes were negative. Orthostatic positive with repeated levels Negative after fluids. Most likely syncope due to dehydration after episodes of abdominal pain and diarrhea. No more episodes of diarrhea. CT Scan of the head and Carotid doppler: Negative for bleeding or any significant stenosis. EGG : Negative for Seizures  Discontinue HCTZ and changed to Metoprolol  Secondary Diagnosis:	HTN (hypertension)  Assessment and plan of treatment:	Continue with blood pressure medication. Maintain a healthy diet that consist of low sugar, low fat, low sodium diet. Exercise frequently if possible.  Follow up with primary care physician in one week after discharge.  Secondary Diagnosis:	Hypothyroidism  Assessment and plan of treatment:	Continue with your synthroid medication. Get constant follow up with TSH levels with you primary care physician for medication adjustment if needed.

## 2018-06-19 NOTE — CONSULT NOTE ADULT - ASSESSMENT
Patient's syncope is likely secondary to dehydration from diarrheal illness. Unlikely to be seizure although EEG performed but did not show any epileptiform activity. Also unlikely to be cardiac although telemetry does have findings of NSVT of 3-5 beats.

## 2018-06-19 NOTE — DISCHARGE NOTE ADULT - PLAN OF CARE
Continue with blood pressure medication. Maintain a healthy diet that consist of low sugar, low fat, low sodium diet. Exercise frequently if possible.  Follow up with primary care physician in one week after discharge. Continue with your synthroid medication. Get constant follow up with TSH levels with you primary care physician for medication adjustment if needed. Resolved. Likely vasovagal. Admitted to the telemetry floor for Syncope.   Work Up was done: Cardiac enzymes were negative. Orthostatic positive with repeated levels Negative after fluids. Most likely syncope due to dehydration after episodes of abdominal pain and diarrhea. No more episodes of diarrhea. CT Scan of the head and Carotid doppler: Negative for bleeding or any significant stenosis. EGG : Negative for Seizures  Discontinue HCTZ and changed to Metoprolol

## 2018-06-19 NOTE — PROGRESS NOTE ADULT - SUBJECTIVE AND OBJECTIVE BOX
==================PGY 1 Note===================   Discussed with supervising resident and primary attending    ================CHIEF COMPLAINT===============  Patient is a 78y old  Female who presents with a chief complaint of diarrhea , syncope (2018 22:59)        =========INTERVAL HPI/OVERNIGHT EVENTS=========  Offers no new complaints      ============CURRENT MEDICATIONS===============    MEDICATIONS  (STANDING):  aspirin  chewable 81 milliGRAM(s) Oral daily  atorvastatin 40 milliGRAM(s) Oral at bedtime  cholecalciferol 2000 Unit(s) Oral daily  cyanocobalamin Injectable 1000 MICROGram(s) IntraMuscular daily  enoxaparin Injectable 40 milliGRAM(s) SubCutaneous daily  hydrochlorothiazide 12.5 milliGRAM(s) Oral daily  levothyroxine 75 MICROGram(s) Oral daily  lisinopril 10 milliGRAM(s) Oral daily  metoprolol succinate ER 25 milliGRAM(s) Oral daily  sodium chloride 0.9%. 1000 milliLiter(s) (100 mL/Hr) IV Continuous <Continuous>    MEDICATIONS  (PRN):  acetaminophen   Tablet. 650 milliGRAM(s) Oral every 6 hours PRN Moderate Pain (4 - 6)        ============REVIEW OF SYSTEMS==================    CONSTITUTIONAL: No fever  EYES: no acute visual disturbances  NECK: No pain or stiffness  RESPIRATORY: No cough; No shortness of breath  CARDIOVASCULAR: No chest pain, no palpitations  GASTROINTESTINAL: No pain. No nausea or vomiting; No diarrhea   NEUROLOGICAL: No headache or numbness, no tremors  MUSCULOSKELETAL: No joint pain, no muscle pain  GENITOURINARY: no dysuria, no frequency, no hesitancy  PSYCHIATRY: no depression , no anxiety  ALL OTHER  ROS negative      ================VITALS SIGNS=====================  Vital Signs Last 24 Hrs  T(C): 36.9 (2018 04:45), Max: 37.1 (2018 07:27)  T(F): 98.4 (2018 04:45), Max: 98.7 (2018 07:27)  HR: 72 (2018 04:45) (72 - 93)  BP: 134/58 (2018 04:45) (106/42 - 134/58)  BP(mean): --  RR: 18 (2018 04:45) (17 - 18)  SpO2: 99% (2018 04:45) (99% - 100%)    ===============PHYSICAL EXAM====================    GENERAL: NAD  HEENT: Normocephalic;  conjunctivae and sclerae clear; moist mucous membranes;   NECK : supple  CHEST/LUNG: Clear to auscultation bilaterally with good air entry   HEART: S1 S2  regular; no murmurs, gallops or rubs  ABDOMEN: Soft, Nontender, Nondistended; Bowel sounds present  EXTREMITIES: no cyanosis; no edema; no calf tenderness  SKIN: warm and dry; no rash  NERVOUS SYSTEM:  Awake and alert; Oriented  to place, person and time    ==============LABORATORIES======================  LABS:                        10.6   9.3   )-----------( 249      ( 2018 06:59 )             33.4     -    143  |  112<H>  |  6<L>  ----------------------------<  81  4.1   |  22  |  0.58    Ca    8.3<L>      2018 06:59  Phos  2.2     -18  Mg     1.7     18    TPro  6.8  /  Alb  3.2<L>  /  TBili  0.3  /  DBili  x   /  AST  39  /  ALT  18  /  AlkPhos  73  06-17    PT/INR - ( 2018 14:10 )   PT: 11.6 sec;   INR: 1.06 ratio         PTT - ( 2018 14:10 )  PTT:26.5 sec  Urinalysis Basic - ( 2018 17:33 )    Color: Yellow / Appearance: Clear / S.005 / pH: x  Gluc: x / Ketone: Negative  / Bili: Negative / Urobili: Negative   Blood: x / Protein: Negative / Nitrite: Negative   Leuk Esterase: Negative / RBC: x / WBC x   Sq Epi: x / Non Sq Epi: x / Bacteria: x      CAPILLARY BLOOD GLUCOSE          =============INPUTS/OUPUTS=====================    18 @ 07:01  -  18 @ 07:00  --------------------------------------------------------  IN: 750 mL / OUT: 0 mL / NET: 750 mL          RADIOLOGY & ADDITIONAL TESTS:    Imaging Personally Reviewed:  YES    Consultant(s) Notes Reviewed:   YES    Care Discussed with Consultants : YES    Plan of care was discussed with patient  and /or primary care giver; all questions and concerns were addressed and care was aligned with patient's wishes. Time was allowed for questions that were answered to the best of my abilities

## 2018-06-19 NOTE — CONSULT NOTE ADULT - PROBLEM SELECTOR RECOMMENDATION 9
patient presented with syncope  -orthostatics were documented to be positive and then later repeat after fluids showed negative orthostatics  -seizures are less likely and eeg showed no epileptiform  -tele shows NSVT  -echo wnl except for grade 3 diastolic dysfunction but clinically patient doesn't have any chf symptoms  -c/w gentle hydration  -orthostatics daily  -increase metoprolol from 25 to 50 daily  -DC HCTZ to avoid further dehydration  -to remain on tele for 24h further for evaluation after increasing metoprolol

## 2018-06-19 NOTE — DISCHARGE NOTE ADULT - PATIENT PORTAL LINK FT
You can access the [facility] Patient Portal, offered by [facility], by registering with the following website: [email or web address]

## 2018-06-19 NOTE — PROGRESS NOTE ADULT - ATTENDING COMMENTS
Patient was seen and examined at bedside, feeling ok.  Denies any episodes of dizziness or palpitation, Tele showing NSVT again early am today.  physical exam:  vitals stable  HEENT: Neck supple  heart: S1 S2 normal  Abdomen: NT< ND  Chest: Clear  LE: NO LE edema  A/p  1- Syncope: most likely vasovagal in nature,  orthostatic negative  EEG negative  Tele showing NSVT, cardio input appreciated  keep K>4, Mg>2.  Ct head, carotid doppler are negative.    2- Hypothyroidism: decrease levothyroxine to 50 mcg.  3- Hypertension: agree to dc hydrochlorothiazide for now,     rest as above  DC planning tomorrow

## 2018-06-19 NOTE — CONSULT NOTE ADULT - SUBJECTIVE AND OBJECTIVE BOX
Patient is a 78y old  Female who presents with a chief complaint of diarrhea , syncope (2018 22:59)      HPI:  77 yo Female with PMH of Hypothyroidism and HTN , comes in with complaint of abdominal pain followed by diarrhea ( 2 loose bowel movements ) . Pain was sudden in onset , about 4/10 in intensity , generalized , accompanied and partially relived by 2 loose BMs. Pain resolved spontaneously Patient also gave a very vague history of syncope , where she remembers going to the bathroom and feeling very warm in her neck and face and the next thing she knew, her daughter woke her up by slapping her face. She passed out for a few minutes.  Patient does not recall the event. No similar episodes in past . Patient felt that she ate outside [employer] yesterday and that's why she had pain. She also report her daughter being sick recently with a cold.   Other than that no hospitalizations , no fever , no chest pain , no palpitations .  Patient had a mechanical fall 2 years back , ended up with Left sided THR.   Patient has no seizure history other than 1 episode of febrile seizure as a child.    In Ed , BP : 125/58 mm hg , HR : 92 , Temp : 98 F  cbc wnl   bmp : hypokalemia   T1 negative   Ua negative (2018 22:59)      PAST MEDICAL & SURGICAL HISTORY:  Hypothyroidism  HTN (hypertension)  S/P cholecystectomy      PREVIOUS DIAGNOSTIC TESTING:      ECHO: performed  FINDINGS: normal EF, grade 3 diastolic dysfunction      MEDICATIONS  (STANDING):  aspirin  chewable 81 milliGRAM(s) Oral daily  atorvastatin 40 milliGRAM(s) Oral at bedtime  cholecalciferol 2000 Unit(s) Oral daily  cyanocobalamin Injectable 1000 MICROGram(s) IntraMuscular daily  enoxaparin Injectable 40 milliGRAM(s) SubCutaneous daily  hydrochlorothiazide 12.5 milliGRAM(s) Oral daily  levothyroxine 75 MICROGram(s) Oral daily  lisinopril 10 milliGRAM(s) Oral daily  metoprolol succinate ER 25 milliGRAM(s) Oral daily  sodium chloride 0.9%. 1000 milliLiter(s) (100 mL/Hr) IV Continuous <Continuous>    MEDICATIONS  (PRN):  acetaminophen   Tablet. 650 milliGRAM(s) Oral every 6 hours PRN Moderate Pain (4 - 6)      FAMILY HISTORY:  No pertinent family history in first degree relatives        REVIEW OF SYSTEMS:  CONSTITUTIONAL: No fever, weight loss, or fatigue  EYES: No eye pain, visual disturbances, or discharge  ENMT:  No difficulty hearing, tinnitus, vertigo; No sinus or throat pain  NECK: No pain or stiffness  RESPIRATORY: No cough, wheezing, chills or hemoptysis; No shortness of breath  CARDIOVASCULAR: No chest pain, palpitations, dizziness, or leg swelling  GASTROINTESTINAL: No abdominal or epigastric pain. No nausea, vomiting, or hematemesis; No diarrhea or constipation. No melena or hematochezia.  GENITOURINARY: No dysuria, frequency, hematuria, or incontinence  NEUROLOGICAL: No headaches, memory loss, loss of strength, numbness, or tremors  SKIN: No itching, burning, rashes, or lesions   LYMPH NODES: No enlarged glands  ENDOCRINE: No heat or cold intolerance; No hair loss  MUSCULOSKELETAL: No joint pain or swelling; No muscle, back, or extremity pain  PSYCHIATRIC: No depression, anxiety, mood swings, or difficulty sleeping  HEME/LYMPH: No easy bruising, or bleeding gums  ALLERY AND IMMUNOLOGIC: No hives or eczema    Vital Signs Last 24 Hrs  T(C): 36.9 (2018 08:27), Max: 37 (2018 19:13)  T(F): 98.5 (2018 08:27), Max: 98.6 (2018 19:13)  HR: 71 (2018 08:27) (71 - 93)  BP: 131/65 (2018 08:27) (106/42 - 134/58)  BP(mean): --  RR: 16 (2018 08:27) (16 - 18)  SpO2: 96% (2018 08:27) (96% - 100%)      PHYSICAL EXAM:  GENERAL: NAD, well-groomed, well-developed  HEAD:  Atraumatic, Normocephalic  EYES: EOMI, PERRLA, conjunctiva and sclera clear  ENMT: No tonsillar erythema, exudates, or enlargement; Moist mucous membranes, Good dentition, No lesions  NECK: Supple, No JVD, Normal thyroid  NERVOUS SYSTEM:  Alert & Oriented X3, Good concentration; Motor Strength 5/5 B/L upper and lower extremities; DTRs 2+ intact and symmetric  CHEST/LUNG: Clear to percussion bilaterally; No rales, rhonchi, wheezing, or rubs  HEART: Regular rate and rhythm; No murmurs, rubs, or gallops  ABDOMEN: Soft, Nontender, Nondistended; Bowel sounds present  EXTREMITIES:  2+ Peripheral Pulses, No clubbing, cyanosis, or edema  LYMPH: No lymphadenopathy noted  SKIN: No rashes or lesions      INTERPRETATION OF TELEMETRY: has episodes of tachycardia sinus with rate of ~120. also has 2 episodes of 3 beats of NSVT and 1 episode with 5 beats of NSVT    ECG:  sinus tach      LABS:                        10.7   7.6   )-----------( 228      ( 2018 07:34 )             31.7     06-19    140  |  107  |  6<L>  ----------------------------<  88  3.7   |  23  |  0.54    Ca    8.7      2018 07:34  Phos  2.2     06-18  Mg     1.7     06-18    TPro  6.8  /  Alb  3.2<L>  /  TBili  0.3  /  DBili  x   /  AST  39  /  ALT  18  /  AlkPhos  73  06-17    CARDIAC MARKERS ( 2018 00:06 )  0.016 ng/mL / x     / 62 U/L / x     / 1.3 ng/mL  CARDIAC MARKERS ( 2018 14:10 )  <0.015 ng/mL / x     / 62 U/L / x     / <1.0 ng/mL      PT/INR - ( 2018 14:10 )   PT: 11.6 sec;   INR: 1.06 ratio         PTT - ( 2018 14:10 )  PTT:26.5 sec  Urinalysis Basic - ( 2018 17:33 )    Color: Yellow / Appearance: Clear / S.005 / pH: x  Gluc: x / Ketone: Negative  / Bili: Negative / Urobili: Negative   Blood: x / Protein: Negative / Nitrite: Negative   Leuk Esterase: Negative / RBC: x / WBC x   Sq Epi: x / Non Sq Epi: x / Bacteria: x      Lipid Panel:   I&O's Summary    2018 07:01  -  2018 07:00  --------------------------------------------------------  IN: 750 mL / OUT: 0 mL / NET: 750 mL        RADIOLOGY & ADDITIONAL STUDIES:

## 2018-06-19 NOTE — PHYSICAL THERAPY INITIAL EVALUATION ADULT - PREDICTED DURATION OF THERAPY (DAYS/WKS), PT EVAL
Episcleritis Counseling:  I have explained to the patient that this is a benign, self-limiting condition that usually resolves completely over the course of a few days to a few weeks. I recommended cool compresses and artificial tears to help relieve discomfort. If recurrent, further medical and laboratory workup may be necessary. Patient encouraged to call if symptoms do not improve. WHILE INHOUSE

## 2018-06-19 NOTE — DISCHARGE NOTE ADULT - HOSPITAL COURSE
Background  and course of admission:    77 y/o Female with PMH of Hypothyroidism and HTN , comes in with complaint of abdominal pain followed by diarrhea (2 loose bowel movements ) . Pain was sudden in onset , about 4/10 in intensity , generalized , accompanied and partially relived by 2 loose BMs. Pain resolved spontaneously. Patient also gave a very vague history of syncope , where she remembers going to the bathroom and then she was on the couch , when her daughter woke her up after she passed out for few minutes. Patient does not recall the event. No similar episodes in past . Patient felt that she ate outside [employer] yesterday and that's why she had pain. She also report her daughter being sick recently with a cold. Other than that no hospitalizations , no fever , no chest pain , no palpitations. Patient had a mechanical fall 2 years back , ended up with Left sided THR.     Admitted to the telemetry floor for Syncope.     Vague history of syncope, in which patient does not remember the event with no injury to head however. Spoke to the daughter. She was out for about 15 seconds and loss sphincter tone and difficulty to be aroused. Work Up was done: Cardiac enzymes were negative. Orthostatic positive with repeated levels Negative after fluids. Most likely syncope due to dehydration after episodes of abdominal pain and diarrhea. No more episodes of diarrhea. CT Scan of the head and Carotid doppler: Negative for bleeding or Significant stenosis. EGG : Negative for Seizures  Changed Metoprolol to 50 BID and D/C HCTZ.     Given patient's improved clinical status and current hemodynamic stability, decision was made to discharge.  Please refer to patient's complete medical chart with documents for a full hospital course, for this is only a brief summary.

## 2018-06-20 VITALS
SYSTOLIC BLOOD PRESSURE: 134 MMHG | HEART RATE: 78 BPM | RESPIRATION RATE: 17 BRPM | OXYGEN SATURATION: 98 % | DIASTOLIC BLOOD PRESSURE: 62 MMHG | TEMPERATURE: 98 F

## 2018-06-20 PROCEDURE — 83036 HEMOGLOBIN GLYCOSYLATED A1C: CPT

## 2018-06-20 PROCEDURE — 95957 EEG DIGITAL ANALYSIS: CPT

## 2018-06-20 PROCEDURE — 95819 EEG AWAKE AND ASLEEP: CPT

## 2018-06-20 PROCEDURE — 84443 ASSAY THYROID STIM HORMONE: CPT

## 2018-06-20 PROCEDURE — 85027 COMPLETE CBC AUTOMATED: CPT

## 2018-06-20 PROCEDURE — 93306 TTE W/DOPPLER COMPLETE: CPT

## 2018-06-20 PROCEDURE — 82962 GLUCOSE BLOOD TEST: CPT

## 2018-06-20 PROCEDURE — 80061 LIPID PANEL: CPT

## 2018-06-20 PROCEDURE — 82550 ASSAY OF CK (CPK): CPT

## 2018-06-20 PROCEDURE — 80053 COMPREHEN METABOLIC PANEL: CPT

## 2018-06-20 PROCEDURE — 82306 VITAMIN D 25 HYDROXY: CPT

## 2018-06-20 PROCEDURE — 93005 ELECTROCARDIOGRAM TRACING: CPT

## 2018-06-20 PROCEDURE — 84100 ASSAY OF PHOSPHORUS: CPT

## 2018-06-20 PROCEDURE — 83605 ASSAY OF LACTIC ACID: CPT

## 2018-06-20 PROCEDURE — 83735 ASSAY OF MAGNESIUM: CPT

## 2018-06-20 PROCEDURE — 70450 CT HEAD/BRAIN W/O DYE: CPT

## 2018-06-20 PROCEDURE — 81003 URINALYSIS AUTO W/O SCOPE: CPT

## 2018-06-20 PROCEDURE — 85730 THROMBOPLASTIN TIME PARTIAL: CPT

## 2018-06-20 PROCEDURE — 93880 EXTRACRANIAL BILAT STUDY: CPT

## 2018-06-20 PROCEDURE — 80048 BASIC METABOLIC PNL TOTAL CA: CPT

## 2018-06-20 PROCEDURE — 71045 X-RAY EXAM CHEST 1 VIEW: CPT

## 2018-06-20 PROCEDURE — 83880 ASSAY OF NATRIURETIC PEPTIDE: CPT

## 2018-06-20 PROCEDURE — 85610 PROTHROMBIN TIME: CPT

## 2018-06-20 PROCEDURE — 82607 VITAMIN B-12: CPT

## 2018-06-20 PROCEDURE — 82553 CREATINE MB FRACTION: CPT

## 2018-06-20 PROCEDURE — 84484 ASSAY OF TROPONIN QUANT: CPT

## 2018-06-20 PROCEDURE — 99285 EMERGENCY DEPT VISIT HI MDM: CPT | Mod: 25

## 2018-06-20 PROCEDURE — 99239 HOSP IP/OBS DSCHRG MGMT >30: CPT

## 2018-06-20 RX ORDER — CHOLECALCIFEROL (VITAMIN D3) 125 MCG
2000 CAPSULE ORAL
Qty: 1 | Refills: 0 | OUTPATIENT
Start: 2018-06-20 | End: 2018-07-19

## 2018-06-20 RX ORDER — LISINOPRIL 2.5 MG/1
1 TABLET ORAL
Qty: 30 | Refills: 0 | OUTPATIENT
Start: 2018-06-20 | End: 2018-07-19

## 2018-06-20 RX ORDER — METOPROLOL TARTRATE 50 MG
1 TABLET ORAL
Qty: 30 | Refills: 0 | OUTPATIENT
Start: 2018-06-20 | End: 2018-07-19

## 2018-06-20 RX ORDER — PREGABALIN 225 MG/1
1 CAPSULE ORAL
Qty: 1 | Refills: 0 | OUTPATIENT
Start: 2018-06-20 | End: 2018-07-19

## 2018-06-20 RX ORDER — LEVOTHYROXINE SODIUM 125 MCG
1 TABLET ORAL
Qty: 0 | Refills: 0 | COMMUNITY

## 2018-06-20 RX ADMIN — LISINOPRIL 10 MILLIGRAM(S): 2.5 TABLET ORAL at 06:24

## 2018-06-20 RX ADMIN — Medication 81 MILLIGRAM(S): at 11:29

## 2018-06-20 RX ADMIN — Medication 50 MILLIGRAM(S): at 06:23

## 2018-06-20 RX ADMIN — Medication 50 MICROGRAM(S): at 06:24

## 2018-06-20 RX ADMIN — Medication 2000 UNIT(S): at 11:29

## 2018-06-20 RX ADMIN — ENOXAPARIN SODIUM 40 MILLIGRAM(S): 100 INJECTION SUBCUTANEOUS at 11:29

## 2018-06-20 RX ADMIN — PREGABALIN 1000 MICROGRAM(S): 225 CAPSULE ORAL at 11:30

## 2022-11-15 NOTE — PHYSICAL THERAPY INITIAL EVALUATION ADULT - STANDING BALANCE: STATIC
good balance
You can access the FollowMyHealth Patient Portal offered by [facility] by registering at the following website: [email or web address]. By joining [employer]’s FollowMyHealth portal, you will also be able to view your health information using other applications (apps) compatible with our system.

## 2024-07-27 NOTE — ED PROVIDER NOTE - RATE
Postpartum Progress Note     Hawa Pineda 29 year old  s/p spontaneous vaginal delivery PPD#1. Pain is moderately controlled on oral medication. Bleeding is mild. Patient is providing Breast milk and formula to her infant. Hawa is ambulating well, tolerating a general diet, and is voiding spontaneously.  Patient is passing gas, and having bowel movements.    Overnight events:   Patient was endorsing lower abdominal cramping as well as burning pain to the site of her prior  incision.  Medication changes included changing Tylenol 650 q6h to 1g TID and ibuprofen 600 q6h to 800 mg TID.  Also, gave 1x Norco 5 and lidocaine gel.  Since patient had diffuse pruritus with her epidural, Benadryl 25 mg q6h PRN was started.  Because of patient's anxiety and mental health concerns, escitalopram 10 mg qd was started.    Today    Patient reports that the Norco and ibuprofen 800 mg mediation resolved the pain.    Denies headache, visual changes, dyspnea, chest pain, RUQ/epigastric pain, lower extremity pain.      PHYSICAL EXAM  Visit Vitals  /62 (BP Location: LUE - Left upper extremity, Patient Position: Semi-Tony's)   Pulse 62   Temp 98.2 °F (36.8 °C) (Oral)   Resp 18   Ht 5' 6\" (1.676 m)   Wt 77.9 kg (171 lb 10.1 oz)   LMP 10/24/2023   SpO2 100%   Breastfeeding Yes   BMI 27.70 kg/m²      BP  Min: 108/64  Max: 126/62  Temp  Av.2 °F (36.8 °C)  Min: 98.2 °F (36.8 °C)  Max: 98.2 °F (36.8 °C)  Pulse  Av.5  Min: 62  Max: 71  Resp  Av  Min: 18  Max: 20  SpO2  Av %  Min: 100 %  Max: 100 %    Gen:  alert, normal affect, no apparent distress.  Skin: Normal color, texture, turgor, no rashes/bruises/active lesions.  CV: RRR, no murmur, pulses intact  Resp: CTAB, no wheeze  Abd: Soft, non-tender, uterine fundus firm and below umbilicus; normoactive bowel sounds.   Extr: No clubbing, no cyanosis, neg edema LE; normal muscle tone and development bilaterally.    24 hour labs/recent imaging significant  for:  - CBC collected and results pending     ASSESSMENT/PLAN:  Hawa Pineda 29 year old  s/p s/p spontaneous vaginal delivery PPD#1.  Pain is well-controlled.    Postpartum care   Encourage ambulation  Pain control:  Tylenol 1g TID  Ibuprofen 800 mg TID  Lidocaine gel  S/p 1x Norco 5   One Time Diet Regular  One Time Diet Regular  Regular Diet  CBC for evaluate of anemia pending, otherwise Asymptomatic  Pre-delivery H/H: 11.4; 11.4/32.4; 32.6 2024  Iron supplementation: None  RPR: Nonreactive 2024  Rh status: O Rh Positive, Rhogam not indicated  Immunizations:  Tdap - 2024  Birth Control: Nexplanon    Mental health problem  Patient has been diagnosed with bipolar in the past.  However, patient's presentation is not consistent with bipolar as she does not have history of best.  Patient has hx of PTSD.  - Start escitalopram 10 mg qd ()  - Consider behavioral health referral    R UE pain, likely 2/2 irritation from medication, resolved.  Patient reported pain on .  Resolved .  There were no signs of inflammation to the R UE, decreasing suspicion for IV infiltration, infection, and phlebitis.  Patient was receiving PCN q4h and pain was likely due to irritation as patient endorses pain along vein in her R UE  - Continue to monitor    Infant:   Information for the patient's :  Horacio Pineda [[id number]]   Feeding Status: Breast milk and formula     Disposition: Anticipate discharge home on PPD #2.  Follow up with Dr. Hardwick. in 2 and 6 weeks for postpartum visits.      Yoon Greco MD  PGY-3 Family Medicine Residency  2024      124